# Patient Record
Sex: FEMALE | Race: WHITE | NOT HISPANIC OR LATINO
[De-identification: names, ages, dates, MRNs, and addresses within clinical notes are randomized per-mention and may not be internally consistent; named-entity substitution may affect disease eponyms.]

---

## 2020-11-05 PROBLEM — Z00.00 ENCOUNTER FOR PREVENTIVE HEALTH EXAMINATION: Status: ACTIVE | Noted: 2020-11-05

## 2020-11-16 ENCOUNTER — APPOINTMENT (OUTPATIENT)
Dept: COLORECTAL SURGERY | Facility: CLINIC | Age: 35
End: 2020-11-16
Payer: COMMERCIAL

## 2020-11-16 VITALS
OXYGEN SATURATION: 97 % | SYSTOLIC BLOOD PRESSURE: 127 MMHG | HEART RATE: 84 BPM | WEIGHT: 203 LBS | HEIGHT: 65 IN | BODY MASS INDEX: 33.82 KG/M2 | TEMPERATURE: 98.3 F | DIASTOLIC BLOOD PRESSURE: 87 MMHG

## 2020-11-16 DIAGNOSIS — Z83.438 FAMILY HISTORY OF OTHER DISORDER OF LIPOPROTEIN METABOLISM AND OTHER LIPIDEMIA: ICD-10-CM

## 2020-11-16 DIAGNOSIS — Z86.19 PERSONAL HISTORY OF OTHER INFECTIOUS AND PARASITIC DISEASES: ICD-10-CM

## 2020-11-16 DIAGNOSIS — Z87.19 PERSONAL HISTORY OF OTHER DISEASES OF THE DIGESTIVE SYSTEM: ICD-10-CM

## 2020-11-16 DIAGNOSIS — Z82.5 FAMILY HISTORY OF ASTHMA AND OTHER CHRONIC LOWER RESPIRATORY DISEASES: ICD-10-CM

## 2020-11-16 DIAGNOSIS — Z83.3 FAMILY HISTORY OF DIABETES MELLITUS: ICD-10-CM
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
## 2020-11-16 DIAGNOSIS — Z72.3 LACK OF PHYSICAL EXERCISE: ICD-10-CM

## 2020-11-16 DIAGNOSIS — B00.9 HERPESVIRAL INFECTION, UNSPECIFIED: ICD-10-CM

## 2020-11-16 DIAGNOSIS — Z86.72 PERSONAL HISTORY OF THROMBOPHLEBITIS: ICD-10-CM

## 2020-11-16 DIAGNOSIS — Z82.3 FAMILY HISTORY OF STROKE: ICD-10-CM

## 2020-11-16 DIAGNOSIS — Z83.49 FAMILY HISTORY OF OTHER ENDOCRINE, NUTRITIONAL AND METABOLIC DISEASES: ICD-10-CM

## 2020-11-16 DIAGNOSIS — Z78.9 OTHER SPECIFIED HEALTH STATUS: ICD-10-CM

## 2020-11-16 DIAGNOSIS — K60.3 ANAL FISSURE, UNSPECIFIED: ICD-10-CM

## 2020-11-16 DIAGNOSIS — Z82.49 FAMILY HISTORY OF ISCHEMIC HEART DISEASE AND OTHER DISEASES OF THE CIRCULATORY SYSTEM: ICD-10-CM

## 2020-11-16 DIAGNOSIS — E28.2 POLYCYSTIC OVARIAN SYNDROME: ICD-10-CM

## 2020-11-16 DIAGNOSIS — K60.2 ANAL FISSURE, UNSPECIFIED: ICD-10-CM

## 2020-11-16 PROCEDURE — 46600 DIAGNOSTIC ANOSCOPY SPX: CPT

## 2020-11-16 PROCEDURE — 99203 OFFICE O/P NEW LOW 30 MIN: CPT | Mod: 25

## 2020-11-16 PROCEDURE — 99072 ADDL SUPL MATRL&STAF TM PHE: CPT

## 2020-11-16 RX ORDER — BUPROPION HYDROCHLORIDE 150 MG/1
150 TABLET, EXTENDED RELEASE ORAL
Qty: 30 | Refills: 0 | Status: ACTIVE | COMMUNITY
Start: 2020-10-28

## 2020-11-16 RX ORDER — LISDEXAMFETAMINE DIMESYLATE 40 MG/1
40 CAPSULE ORAL
Qty: 30 | Refills: 0 | Status: ACTIVE | COMMUNITY
Start: 2020-10-28

## 2020-11-16 RX ORDER — OMEPRAZOLE 40 MG/1
40 CAPSULE, DELAYED RELEASE ORAL
Qty: 30 | Refills: 0 | Status: ACTIVE | COMMUNITY
Start: 2020-09-28

## 2020-11-16 NOTE — REVIEW OF SYSTEMS
[As Noted in HPI] : as noted in HPI [Anxiety] : anxiety [Depression] : depression [Negative] : Heme/Lymph [FreeTextEntry7] : bloating, blood per rectum, straining to move bowels [FreeTextEntry8] : leaking urine [de-identified] : stress

## 2020-11-16 NOTE — HISTORY OF PRESENT ILLNESS
[FreeTextEntry1] : 36yo female presents for initial evaluation\par Referred by GI Dr Carrillo Souza\par \par Patient is an FNP\par \par Reports 8 month history of GI symptoms, started around same time as starting new job\par Developed reflux, abdominal bloating, variation in BMs between diarrhea and constipation. \par 5 months ago developed pain with BM. H/o hemorrhoids managed previously with OTC meds, this time they did not improve her symptoms. Noticed blood on tissue and in bowl with BMs.\par Felt palpable cord in perianal region on self examination. Denies discharge or drainage, denies fever.\par Denies family history of CRC or IBD\par \par Spoke with PCP, who referred to Dr Souza of GI for further workup\par \par Capsule endoscopy 10/14/2020:\par "Summary:\par - mild duodenal erythema and an incidental jejunal lymphangioectasia, both of likely no clinical significance.\par - otherwise normal wireless capsule endoscopy of the small intestine. no evidence of small bowel Crohn's disease."\par \par Colonoscopy completed 7/31/2020\par "Impression:\par - perianal fistula found on perianal exam.\par - the examined portion of the ileum was normal. biopsied\par - the entire examined colon is normal. biopsied.\par - no evidence of inflammatory bowel disease.\par - non-bleeding internal hemorrhoids"\par \par Upper GI endoscopy completed 7/31/2020\par "impression:\par - z-line regular,  40cm from the incisors\par - normal esophagus\par - normal stomach. biopsied.\par - normal duodenal bulb. biopsied.\par - normal second portion of the duodenum. 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 A suspected intersphincteric fistulous tract is identified in the 6 oclock axis commencing approximately 2.5cm from the anal verge and extending superiorly. There is no abscess"\par \par Denies fecal incontinence to gas, liquid/solid stool\par Reports urinary incontinence since she was a teenager. Has discussed with Gyn, plans to start pelvic floor physical therapy after anorectal pathology is managed.\par \par Per GI records, IBD ruled out\par Patient started on fiber supplement daily which has improved bowel habits. Reports abdominal urgency/cramping with stress, occurs 1-2x/month. Consistency and regularity of stool improved\par Bleeding resolved, though did notice BRB spotting with wiping 2 days ago.\par Anorectal pain resolved, reports occasional discomfort\par Reflux symptoms improved since starting omeprazole\par \par H/o appendectomy in 2011, developed cdiff after antibiotics\par

## 2020-11-16 NOTE — PHYSICAL EXAM
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\par \par Gen no acute distress, alert and oriented\par Psych calm, pleasant demeanor, responding appropriately to questions\par Nonlabored breathing\par Ambulating without assistance\par Skin normal color and pigment, no visible lesions or rashes\par \par Anorectal Exam:\par Inspection no erythema, induration or fluctuance, posterior perianal external opening about 2 cm from anal verge to left of midline, palpable cord posterior midline, with gentle spread of buttocks superficial internal opening noted posterior midline at anal verge\par AMBROSIO nontender, no masses palpated, no blood on gloved finger, good squeeze with noted gluteal recruitment\par \par Procedure: Anoscopy\par \par Pre procedure Diagnosis: anal fistula\par Post procedure Diagnosis: anal fissure/fistula complex\par Anesthesia: none\par Estimated blood loss: none\par Specimen: none\par Complications: none\par \par Consent obtained. Anoscopy was performed by passing a lighted anoscope with lubricant jelly into the anal canal and the entire anal mucosal surface was inspected. Findings included posterior midline anal fissure, superficial internal opening at base of fissure distal to dentate line, noninflamed internal hemorrhoid tissue \par \par Patient tolerated examination and procedure well.\par \par \par

## 2020-11-16 NOTE — ASSESSMENT
[FreeTextEntry1] : Exam findings and diagnosis were discussed at length with patient.\par Outside records from referring provider, GI, Dr Souza reviewed.\par Anal fissure, superficial anal fistula complex noted on clinical exam and anoscopy. \par Recommend continue bowel regimen per GI.\par Recommend sitz baths.\par Recommend patient consider fistulotomy for fissure/fistula\par The nature of the procedure as well as risks and benefits were reviewed with the patient. Risk/benefits/alternatives discussed at length, including but not limited to pain, bleeding, infection, swelling, recurrence of symptoms, need for future surgery, scarring, and risk of alteration of bowel habits, such as seepage, fecal urgency and/or fecal (gas, liquid or solid) incontinence discussed, which may be temporary or permanent. The preoperative, intraoperative, and postoperative management were discussed with the patient in detail. All questions were answered, patient expressed understanding, and would like to proceed with the proposed surgery. Informed consent was obtained. Ambulatory surgery instructions to be given to patient.\par

## 2020-12-28 ENCOUNTER — TRANSCRIPTION ENCOUNTER (OUTPATIENT)
Age: 35
End: 2020-12-28

## 2021-01-14 ENCOUNTER — TRANSCRIPTION ENCOUNTER (OUTPATIENT)
Age: 36
End: 2021-01-14

## 2021-01-14 ENCOUNTER — NON-APPOINTMENT (OUTPATIENT)
Age: 36
End: 2021-01-14

## 2021-01-14 LAB — SARS-COV-2 N GENE NPH QL NAA+PROBE: NOT DETECTED

## 2021-01-15 ENCOUNTER — RESULT REVIEW (OUTPATIENT)
Age: 36
End: 2021-01-15

## 2021-01-15 ENCOUNTER — APPOINTMENT (OUTPATIENT)
Dept: COLORECTAL SURGERY | Facility: CLINIC | Age: 36
End: 2021-01-15
Payer: COMMERCIAL

## 2021-01-15 ENCOUNTER — OUTPATIENT (OUTPATIENT)
Dept: OUTPATIENT SERVICES | Facility: HOSPITAL | Age: 36
LOS: 1 days | Discharge: HOME | End: 2021-01-15
Payer: COMMERCIAL

## 2021-01-15 PROCEDURE — 88304 TISSUE EXAM BY PATHOLOGIST: CPT | Mod: 26

## 2021-01-15 PROCEDURE — 46270 REMOVE ANAL FIST SUBQ: CPT | Mod: GC

## 2021-01-15 RX ORDER — DOCUSATE SODIUM 100 MG
1 CAPSULE ORAL
Qty: 10 | Refills: 0
Start: 2021-01-15 | End: 2021-01-19

## 2021-01-15 NOTE — BRIEF OPERATIVE NOTE - NSICDXBRIEFPREOP_GEN_ALL_CORE_FT
PRE-OP DIAGNOSIS:  Anal fissure 15-Gallito-2021 12:32:21  Lupe Hartmann  Anal fistula 15-Gallito-2021 12:32:11  Lupe Hartmann

## 2021-01-15 NOTE — BRIEF OPERATIVE NOTE - NSICDXBRIEFPOSTOP_GEN_ALL_CORE_FT
POST-OP DIAGNOSIS:  Anal fistula 15-Galilto-2021 12:32:45  Lupe Hartmann  Anal fissure 15-Gallito-2021 12:32:30  Lupe Hartmann

## 2021-01-15 NOTE — BRIEF OPERATIVE NOTE - OPERATION/FINDINGS
Posterior midline fistula tract and fissure identified and probe placed in tract. Incision made along mucosa and through subcutaneous tissue to probe. Currette used to debride tissue. Hemostasis achieved using electrocautery.

## 2021-01-19 ENCOUNTER — NON-APPOINTMENT (OUTPATIENT)
Age: 36
End: 2021-01-19

## 2021-01-19 LAB — SURGICAL PATHOLOGY STUDY: SIGNIFICANT CHANGE UP

## 2021-01-29 ENCOUNTER — NON-APPOINTMENT (OUTPATIENT)
Age: 36
End: 2021-01-29